# Patient Record
Sex: FEMALE | Race: WHITE | NOT HISPANIC OR LATINO | URBAN - METROPOLITAN AREA
[De-identification: names, ages, dates, MRNs, and addresses within clinical notes are randomized per-mention and may not be internally consistent; named-entity substitution may affect disease eponyms.]

---

## 2023-10-12 PROBLEM — M54.9 BACKACHE WITHOUT RADIATION: Status: ACTIVE | Noted: 2023-10-12

## 2023-10-12 PROBLEM — L29.9 ITCHING: Status: ACTIVE | Noted: 2023-10-12

## 2023-10-12 PROBLEM — M47.816 LUMBAR SPONDYLOSIS: Status: ACTIVE | Noted: 2023-10-12

## 2023-10-12 PROBLEM — E03.9 HYPOTHYROIDISM: Status: ACTIVE | Noted: 2023-10-12

## 2023-10-12 PROBLEM — R74.8 ELEVATED ALKALINE PHOSPHATASE LEVEL: Status: ACTIVE | Noted: 2023-10-12

## 2023-10-12 PROBLEM — I10 ESSENTIAL HYPERTENSION: Status: ACTIVE | Noted: 2023-10-12

## 2023-10-12 PROBLEM — R21 RASH: Status: ACTIVE | Noted: 2023-10-12

## 2023-10-12 PROBLEM — I42.9 CARDIOMYOPATHY (MULTI): Status: ACTIVE | Noted: 2023-10-12

## 2023-10-12 PROBLEM — R60.9 EDEMA: Status: ACTIVE | Noted: 2023-10-12

## 2023-10-12 PROBLEM — M85.80 OSTEOPENIA: Status: ACTIVE | Noted: 2023-10-12

## 2023-10-12 PROBLEM — L65.9 HAIR LOSS: Status: ACTIVE | Noted: 2023-10-12

## 2023-10-12 PROBLEM — R93.89 THICKENED ENDOMETRIUM: Status: ACTIVE | Noted: 2023-10-12

## 2023-10-12 PROBLEM — L29.9 PRURITUS: Status: ACTIVE | Noted: 2023-10-12

## 2023-10-12 PROBLEM — M19.90 OSTEOARTHRITIS: Status: ACTIVE | Noted: 2023-10-12

## 2023-10-12 PROBLEM — E55.9 VITAMIN D INSUFFICIENCY: Status: ACTIVE | Noted: 2023-10-12

## 2023-10-12 PROBLEM — M25.519 SHOULDER PAIN: Status: ACTIVE | Noted: 2023-10-12

## 2023-10-12 PROBLEM — H35.30 MACULAR DEGENERATION: Status: ACTIVE | Noted: 2023-10-12

## 2023-10-12 PROBLEM — M79.7 FIBROMYALGIA: Status: ACTIVE | Noted: 2023-10-12

## 2023-10-12 PROBLEM — K90.49 FOOD INTOLERANCE IN CHILD: Status: ACTIVE | Noted: 2023-10-12

## 2023-10-12 RX ORDER — SPIRONOLACTONE 25 MG/1
1 TABLET ORAL DAILY
COMMUNITY

## 2023-10-12 RX ORDER — FUROSEMIDE 20 MG/1
1 TABLET ORAL DAILY PRN
COMMUNITY

## 2023-10-12 RX ORDER — LEVOTHYROXINE SODIUM 25 UG/1
1 TABLET ORAL DAILY
COMMUNITY
Start: 2015-10-12

## 2023-10-12 RX ORDER — CARVEDILOL 25 MG/1
25 TABLET ORAL 2 TIMES DAILY
COMMUNITY
Start: 2021-07-22

## 2023-10-12 RX ORDER — VIT A/VIT C/VIT E/ZINC/COPPER 4296-226
90 CAPSULE ORAL DAILY
COMMUNITY

## 2023-10-12 RX ORDER — SACUBITRIL AND VALSARTAN 97; 103 MG/1; MG/1
1 TABLET, FILM COATED ORAL 2 TIMES DAILY
COMMUNITY

## 2023-10-12 RX ORDER — CHOLECALCIFEROL (VITAMIN D3) 50 MCG
2000 TABLET ORAL DAILY
COMMUNITY
Start: 2022-05-16

## 2023-10-12 RX ORDER — LEVOCETIRIZINE DIHYDROCHLORIDE 5 MG/1
5 TABLET, FILM COATED ORAL EVERY EVENING
COMMUNITY

## 2023-10-12 RX ORDER — HYDROGEN PEROXIDE 3 %
20 SOLUTION, NON-ORAL MISCELLANEOUS
COMMUNITY

## 2023-10-13 ENCOUNTER — OFFICE VISIT (OUTPATIENT)
Dept: RHEUMATOLOGY | Facility: CLINIC | Age: 83
End: 2023-10-13
Payer: MEDICARE

## 2023-10-13 VITALS
OXYGEN SATURATION: 98 % | SYSTOLIC BLOOD PRESSURE: 132 MMHG | DIASTOLIC BLOOD PRESSURE: 60 MMHG | TEMPERATURE: 96.7 F | HEIGHT: 62 IN | WEIGHT: 138 LBS | HEART RATE: 78 BPM | BODY MASS INDEX: 25.4 KG/M2

## 2023-10-13 DIAGNOSIS — M79.7 FIBROMYALGIA: ICD-10-CM

## 2023-10-13 DIAGNOSIS — M25.512 LEFT SHOULDER PAIN, UNSPECIFIED CHRONICITY: ICD-10-CM

## 2023-10-13 DIAGNOSIS — I42.9 CARDIOMYOPATHY, UNSPECIFIED TYPE (MULTI): Primary | ICD-10-CM

## 2023-10-13 PROCEDURE — 3075F SYST BP GE 130 - 139MM HG: CPT | Performed by: INTERNAL MEDICINE

## 2023-10-13 PROCEDURE — 1159F MED LIST DOCD IN RCRD: CPT | Performed by: INTERNAL MEDICINE

## 2023-10-13 PROCEDURE — 99214 OFFICE O/P EST MOD 30 MIN: CPT | Performed by: INTERNAL MEDICINE

## 2023-10-13 PROCEDURE — 1036F TOBACCO NON-USER: CPT | Performed by: INTERNAL MEDICINE

## 2023-10-13 PROCEDURE — 1160F RVW MEDS BY RX/DR IN RCRD: CPT | Performed by: INTERNAL MEDICINE

## 2023-10-13 PROCEDURE — 1126F AMNT PAIN NOTED NONE PRSNT: CPT | Performed by: INTERNAL MEDICINE

## 2023-10-13 PROCEDURE — 3078F DIAST BP <80 MM HG: CPT | Performed by: INTERNAL MEDICINE

## 2023-10-13 ASSESSMENT — ENCOUNTER SYMPTOMS
LOSS OF SENSATION IN FEET: 0
DEPRESSION: 0
OCCASIONAL FEELINGS OF UNSTEADINESS: 0

## 2023-10-13 ASSESSMENT — PATIENT HEALTH QUESTIONNAIRE - PHQ9
2. FEELING DOWN, DEPRESSED OR HOPELESS: NOT AT ALL
1. LITTLE INTEREST OR PLEASURE IN DOING THINGS: NOT AT ALL
SUM OF ALL RESPONSES TO PHQ9 QUESTIONS 1 AND 2: 0

## 2023-10-13 NOTE — ASSESSMENT & PLAN NOTE
He has nonischemic cardiomyopathy, followed at the [facility].  Echo done October 2018 showed LVEF 15 to 20%.  She also has a left bundle branch block.  She has an implantable defibrillator with remote checks every 3 months.  LVEF on echo August 2022 was improved at 59% . (She was previously advised that she should not take any more steroid injections or oral steroids-she previously was getting her left shoulder injected by Dr. Ivette Horan).

## 2023-10-13 NOTE — PROGRESS NOTES
Subjective   Patient ID: Mylene Sierra is a 83 y.o. female who presents for Follow-up.    HPI 83 year old with history of fibromyalgia, hypothyroidism and nonischemic cardiomyopathy who is here with complaints of hurting all over.  She has a past history of fibromyalgia.  She previously took amitriptyline 20 mg at bedtime, but she has not stopped this  for 4 to 5 years and she was diagnosed with nonischemic cardiomyopathy.  She states that her cardiologist told her she could take 10 mg but could not take 20 mg.    She now complains of feeling widespread body aching over the last several months.  She complains of aching in both shoulders.  She complains of a burning sensation in her neck.  She has some pain which radiates down her left arm..  She also gets aching in both calf muscles.  She tried Salon Pas with lidocaine on left shoulder, which did not help.  She tried tylenol which did not help.    She has been under a lot of personal stress this year.  Her son passed away suddenly after her stroke February 2023, after spending 1 month in the hospital.  She buried her son March 2023.    She moved to a new home.    Her  suffered a stroke June 16, 2023, also had a seizure.  He needed to go to a rehab facility for period time.  He is not allowed to drive until next year.    Her dog passed away 8/23.    She complains of trouble sleeping.   His melatonin but wakes up after 2 to 3 hours .    She is followed at the [facility] for nonischemic cardiomyopathy br Dr. Vincent and Dr. Pierce.  Echocardiogram done October 2018 show her left ventricle ejection fraction to be 15-20%. She still has a left bundle branch block.   She states that she is seen twice per year.  She has an implantable defibrillator (CRT-D) with remote checks every 3 months.  Review of [facility] cardiology note from 8/22 shows that her left ventricle ejection fraction recovered to 59%.    She is also been told she can no longer take  steroids, so she apparently cannot have her shoulder injected any further (she previously had corticosteroid injections in her left shoulder from Dr. Ivette Horan).    She is known to have significant arthritis in her left shoulder.  She was previously getting Kenalog injections from Dr. Horan in orthopedics.  He was previously told he may eventually need reverse shoulder arthroplasty.    She has a new primary care physician at the [facility] name Dr. Zhang.    She completed injections in right eye from Dr. Jose Agarwal for AMD.  She tried Tylenol for the shoulder pain which did not help    Labs 4/17: BMP normal, chol 178, HDL 55, , TG 79, 25-hydroxy vit D 30  Labs 2/18: Cholesterol 206, , HDL 70, triglycerides 85  Labs August 2018: Sodium 146, potassium 4.3, BUN 19, creatinine 0.88, glucose 124  Labs 2/19: Sodium 145, potassium 4.1, BUN 15, creatinine 0.93, glucose 113 .  Labs July 2019: CBC normal, potassium 5.6, BUN 15, creatinine 0.97, glucose 111  Lab September 2020: CBC normal, BMP normal except creatinine 0.96 (GFR 56), TSH 1.58, vitamin B-12 normal  Labs May 2023: CMP normal, cholesterol 178, HDL 63, , triglycerides 54    DEXA September 2020: T score -1.7 right femoral neck, T score -2 left femoral neck, T score -1.4 lumbar spine    Health maintenance:   mamm 8/18   colonoscopy- has been recommended and refused    pneumovax 2008   prevnar 13 2/15   zostavax 2009     Medical problem list:   - nonischemic cardiomyopathy (combined systolic and diastolic dysfunction)- LVEF 15-20% October 2018. s/p CRT-D 5/19. LVEF recovered to 59% on echo 1/21.   - LBBB   - hypothyroid   - fibromyalgia   -Advanced OA left shoulder   -Macular degeneration.    ROS:  General: Denies fevers or chills.  CV: Denies chest pain or palpitations.  Denies leg edema.  Lungs: Denies coughing or shortness of breath.  Skin: Denies rashes or nodules.  MS: c/o generalized body pain- especially neck, both shoulders  "(left greater than right), both calves.    Objective   /60 (BP Location: Right arm, Patient Position: Sitting, BP Cuff Size: Adult)   Pulse 78   Temp 35.9 °C (96.7 °F) (Temporal)   Ht 1.575 m (5' 2\")   Wt 62.6 kg (138 lb)   SpO2 98%   BMI 25.24 kg/m²     Physical Exam  HEENT: PERRL, EOMI  Neck: Supple, no nodes.  CV: RRR, no MGR.  Lungs: Clear, no rales or wheezes.  Abdomen: Soft, nontender. No hepatosplenomegaly.  Extremities:  No cyanosis, clubbing, or edema.  MS: Widespread soft tissue tender points, suggestive of fibromyalgia.      Assessment/Plan   Problem List Items Addressed This Visit             ICD-10-CM    Cardiomyopathy (CMS/HCC) - Primary I42.9     He has nonischemic cardiomyopathy, followed at the [facility].  Echo done October 2018 showed LVEF 15 to 20%.  She also has a left bundle branch block.  She has an implantable defibrillator with remote checks every 3 months.  LVEF on echo August 2022 was improved at 59% . (She was previously advised that she should not take any more steroid injections or oral steroids-she previously was getting her left shoulder injected by Dr. Ivette Horan).         Fibromyalgia M79.7     She has history of fibromyalgia, she was previously advised by cardiology to stop amitriptyline,.         Shoulder pain M25.519      1. Nonischemic cardiomyopathy- Now established with [facility] cardiology. f/u with [facility] cardiology. Got pacemaker and defibrillator placed 5/19. LVEF improved to 59% on echo 1/21.    2. Advanced OA left shoulder- being followed by Dr. Ivette Horan in orthopedics. Has had previous Kenalog injections, but apparently has been told by cardiology that she cannot have these anymore.    3 Fibromyalgia- now has recurrence of symptoms .she has been under a lot of stress this year, with the death of her son  2/23 , moved to a new home , and her  have a stroke June 2023 .  She now states that her cardiologist told her she could take 10 mg of " amitriptyline but should not take 20 mg of amitriptyline .I will check with her cardiologist Dr. Vincent if she can resume amitriptyline 10 mg at bedtime  (which was previously helpful for her ).     Plan:  We will check with your cardiologist Dr. Vincent if you can resume amitriptyline 10 mg at bedtime, as I believe you are having a recurrence of your fibromyalgia.  Follow-up in 3 months.

## 2023-10-17 ENCOUNTER — TELEPHONE (OUTPATIENT)
Dept: PRIMARY CARE | Facility: CLINIC | Age: 83
End: 2023-10-17
Payer: MEDICARE

## 2023-10-24 ENCOUNTER — TELEPHONE (OUTPATIENT)
Dept: PRIMARY CARE | Facility: CLINIC | Age: 83
End: 2023-10-24
Payer: MEDICARE

## 2023-10-24 DIAGNOSIS — M79.7 FIBROMYALGIA: Primary | ICD-10-CM

## 2023-10-24 RX ORDER — AMITRIPTYLINE HYDROCHLORIDE 10 MG/1
10 TABLET, FILM COATED ORAL NIGHTLY
Qty: 90 TABLET | Refills: 1 | Status: SHIPPED | OUTPATIENT
Start: 2023-10-24 | End: 2024-01-15 | Stop reason: SDUPTHER

## 2023-10-24 RX ORDER — AMITRIPTYLINE HYDROCHLORIDE 10 MG/1
10 TABLET, FILM COATED ORAL NIGHTLY
COMMUNITY
End: 2023-10-24 | Stop reason: SDUPTHER

## 2024-01-15 ENCOUNTER — OFFICE VISIT (OUTPATIENT)
Dept: RHEUMATOLOGY | Facility: CLINIC | Age: 84
End: 2024-01-15
Payer: MEDICARE

## 2024-01-15 VITALS
WEIGHT: 140 LBS | TEMPERATURE: 97.5 F | HEART RATE: 58 BPM | DIASTOLIC BLOOD PRESSURE: 54 MMHG | HEIGHT: 62 IN | OXYGEN SATURATION: 94 % | SYSTOLIC BLOOD PRESSURE: 96 MMHG | BODY MASS INDEX: 25.76 KG/M2

## 2024-01-15 DIAGNOSIS — I42.9 CARDIOMYOPATHY, UNSPECIFIED TYPE (MULTI): ICD-10-CM

## 2024-01-15 DIAGNOSIS — M79.7 FIBROMYALGIA: Primary | ICD-10-CM

## 2024-01-15 PROCEDURE — 1126F AMNT PAIN NOTED NONE PRSNT: CPT | Performed by: INTERNAL MEDICINE

## 2024-01-15 PROCEDURE — 1036F TOBACCO NON-USER: CPT | Performed by: INTERNAL MEDICINE

## 2024-01-15 PROCEDURE — 99214 OFFICE O/P EST MOD 30 MIN: CPT | Performed by: INTERNAL MEDICINE

## 2024-01-15 PROCEDURE — 3078F DIAST BP <80 MM HG: CPT | Performed by: INTERNAL MEDICINE

## 2024-01-15 PROCEDURE — 1159F MED LIST DOCD IN RCRD: CPT | Performed by: INTERNAL MEDICINE

## 2024-01-15 PROCEDURE — 3074F SYST BP LT 130 MM HG: CPT | Performed by: INTERNAL MEDICINE

## 2024-01-15 RX ORDER — AMITRIPTYLINE HYDROCHLORIDE 10 MG/1
10 TABLET, FILM COATED ORAL NIGHTLY
Qty: 90 TABLET | Refills: 1 | Status: SHIPPED | OUTPATIENT
Start: 2024-01-15

## 2024-01-15 ASSESSMENT — ENCOUNTER SYMPTOMS
OCCASIONAL FEELINGS OF UNSTEADINESS: 0
DEPRESSION: 0
LOSS OF SENSATION IN FEET: 0

## 2024-01-15 ASSESSMENT — PATIENT HEALTH QUESTIONNAIRE - PHQ9
SUM OF ALL RESPONSES TO PHQ9 QUESTIONS 1 AND 2: 0
2. FEELING DOWN, DEPRESSED OR HOPELESS: NOT AT ALL
1. LITTLE INTEREST OR PLEASURE IN DOING THINGS: NOT AT ALL

## 2024-01-15 NOTE — PROGRESS NOTES
Subjective   Patient ID: Mylene Sierra is a 83 y.o. female who presents for Follow-up regarding fibromyalgia.    HPI 83 year old with history of fibromyalgia, hypothyroidism and nonischemic cardiomyopathy who is here with complaints of hurting all over.  She has a past history of fibromyalgia.  She previously took amitriptyline 20 mg at bedtime, but she has not stopped this  for 4 to 5 years and she was diagnosed with nonischemic cardiomyopathy.  She states that her cardiologist told her she could take 10 mg but could not take 20 mg.     She resumed elavil 10 mg at bedtime October 2023 (with the permission of her cardiologist)  .  She is having less pain and improved motion in her shoulders.  She is also sleeping better.     She has been under a lot of personal stress this year.  Her son passed away suddenly after her stroke February 2023, after spending 1 month in the hospital.  She buried her son March 2023.     Her  suffered a stroke June 16, 2023, also had a seizure.  He needed to go to a rehab facility for period time.  He is not allowed to drive until next year.     She is followed at the [facility] for nonischemic cardiomyopathy br Dr. Vincent and Dr. Pierce.  Echocardiogram done October 2018 show her left ventricle ejection fraction to be 15-20%. She still has a left bundle branch block.   She states that she is seen twice per year.  She has an implantable defibrillator (CRT-D) with remote checks every 3 months.  Review of [facility] cardiology note from 8/22 shows that her left ventricle ejection fraction recovered to 59%.     She is also been told she can no longer take steroids, so she apparently cannot have her shoulder injected any further (she previously had corticosteroid injections in her left shoulder from Dr. Ivette Horan).     She is known to have significant arthritis in her left shoulder.  She was previously getting Kenalog injections from Dr. Horan in orthopedics.  He  "was previously told he may eventually need reverse shoulder arthroplasty.     She has a new primary care physician at the [facility] name Dr. Zhang.     She completed injections in right eye from Dr. Jose Agarwal for AMD.  She tried Tylenol for the shoulder pain which did not help     Labs 4/17: BMP normal, chol 178, HDL 55, , TG 79, 25-hydroxy vit D 30  Labs 2/18: Cholesterol 206, , HDL 70, triglycerides 85  Labs August 2018: Sodium 146, potassium 4.3, BUN 19, creatinine 0.88, glucose 124  Labs 2/19: Sodium 145, potassium 4.1, BUN 15, creatinine 0.93, glucose 113 .  Labs July 2019: CBC normal, potassium 5.6, BUN 15, creatinine 0.97, glucose 111  Lab September 2020: CBC normal, BMP normal except creatinine 0.96 (GFR 56), TSH 1.58, vitamin B-12 normal  Labs May 2023: CMP normal, cholesterol 178, HDL 63, , triglycerides 54     DEXA September 2020: T score -1.7 right femoral neck, T score -2 left femoral neck, T score -1.4 lumbar spine     Health maintenance:   mamm 8/18   colonoscopy- has been recommended and refused    pneumovax 2008   prevnar 13 2/15   zostavax 2009     Medical problem list:   - nonischemic cardiomyopathy (combined systolic and diastolic dysfunction)- LVEF 15-20% October 2018. s/p CRT-D 5/19. LVEF recovered to 59% on echo 1/21.   - LBBB   - hypothyroid   - fibromyalgia   -Advanced OA left shoulder   -Macular degeneration.     ROS:  General: Denies fevers or chills.  CV: Denies chest pain or palpitations.  Denies leg edema.  Lungs: Denies coughing or shortness of breath.  Skin: Denies rashes or nodules.  MS: c/o generalized body pain- especially neck, both shoulders (left greater than right), both calves. However, symptoms are significantly improved with elavil 10 mg at bedtime.      Objective   BP 96/54 (BP Location: Left arm, Patient Position: Sitting, BP Cuff Size: Small adult)   Pulse 58   Temp 36.4 °C (97.5 °F)   Ht 1.575 m (5' 2\")   Wt 63.5 kg (140 lb)   SpO2 " 94%   BMI 25.61 kg/m²     Physical Exam  HEENT: PERRL, EOMI  Neck: Supple, no nodes.  CV: RRR, no MGR.  Lungs: Clear, no rales or wheezes.  Abdomen: Soft, nontender. No hepatosplenomegaly.  Extremities:  No cyanosis, clubbing, or edema.  MS: Widespread soft tissue tender points, suggestive of fibromyalgia.    Assessment/Plan   Problem List Items Addressed This Visit             ICD-10-CM    Cardiomyopathy (CMS/HCC) I42.9    Fibromyalgia - Primary M79.7    BMI 25.0-25.9,adult Z68.25         Fibromyalgia-symptoms have significantly improved with addition of Elavil 10 mg at bedtime.    Nonischemic cardiomyopathy-followed by cardiology at the [facility].  She was last seen October 25, 2023.  Echo done October 2018 showed LVEF 15 to 20%.  She also has a left bundle branch block.  She has an implantable defibrillator with remote checks every 3 months.  LVEF on echo August 2022 was improved to 59%.  She was previously advised that she can no longer take steroid injections or oral steroids.    Shoulder pain-he has advanced OA of left shoulder.  She was previously getting injections from Dr. Ivette Horan in orthopedics.  Her cardiologist advised her that she can no longer take Kenalog injections or take oral steroids due to her cardiomyopathy.    Plan:  Continue elavil 10 mg at bedtime.  I recommend completing living will.  She was given a blank document to complete.  Follow-up in 6 months.

## 2024-07-15 ENCOUNTER — APPOINTMENT (OUTPATIENT)
Dept: RHEUMATOLOGY | Facility: CLINIC | Age: 84
End: 2024-07-15
Payer: MEDICARE

## 2024-08-27 ENCOUNTER — APPOINTMENT (OUTPATIENT)
Dept: RHEUMATOLOGY | Facility: CLINIC | Age: 84
End: 2024-08-27
Payer: MEDICARE